# Patient Record
Sex: FEMALE | Race: WHITE | NOT HISPANIC OR LATINO | Employment: OTHER | URBAN - NONMETROPOLITAN AREA
[De-identification: names, ages, dates, MRNs, and addresses within clinical notes are randomized per-mention and may not be internally consistent; named-entity substitution may affect disease eponyms.]

---

## 2024-11-05 ENCOUNTER — OFFICE VISIT (OUTPATIENT)
Dept: MEDICAL GROUP | Facility: PHYSICIAN GROUP | Age: 54
End: 2024-11-05
Payer: MEDICARE

## 2024-11-05 VITALS
OXYGEN SATURATION: 97 % | WEIGHT: 163.2 LBS | RESPIRATION RATE: 20 BRPM | BODY MASS INDEX: 30.03 KG/M2 | TEMPERATURE: 97.1 F | DIASTOLIC BLOOD PRESSURE: 76 MMHG | HEART RATE: 89 BPM | HEIGHT: 62 IN | SYSTOLIC BLOOD PRESSURE: 126 MMHG

## 2024-11-05 DIAGNOSIS — M54.6 CHRONIC BILATERAL THORACIC BACK PAIN: ICD-10-CM

## 2024-11-05 DIAGNOSIS — R00.2 PALPITATIONS: ICD-10-CM

## 2024-11-05 DIAGNOSIS — Z72.0 TOBACCO ABUSE: Chronic | ICD-10-CM

## 2024-11-05 DIAGNOSIS — Z12.12 SCREENING FOR COLORECTAL CANCER: ICD-10-CM

## 2024-11-05 DIAGNOSIS — Z12.11 SCREENING FOR COLORECTAL CANCER: ICD-10-CM

## 2024-11-05 DIAGNOSIS — K21.00 GASTROESOPHAGEAL REFLUX DISEASE WITH ESOPHAGITIS, UNSPECIFIED WHETHER HEMORRHAGE: ICD-10-CM

## 2024-11-05 DIAGNOSIS — E11.9 TYPE 2 DIABETES MELLITUS WITHOUT COMPLICATION, WITHOUT LONG-TERM CURRENT USE OF INSULIN (HCC): ICD-10-CM

## 2024-11-05 DIAGNOSIS — B35.1 ONYCHOMYCOSIS: ICD-10-CM

## 2024-11-05 DIAGNOSIS — F20.0 PARANOID SCHIZOPHRENIA (HCC): ICD-10-CM

## 2024-11-05 DIAGNOSIS — K59.09 OTHER CONSTIPATION: ICD-10-CM

## 2024-11-05 DIAGNOSIS — E78.00 PURE HYPERCHOLESTEROLEMIA: Chronic | ICD-10-CM

## 2024-11-05 DIAGNOSIS — K21.00 GASTROESOPHAGEAL REFLUX DISEASE WITH ESOPHAGITIS: Chronic | ICD-10-CM

## 2024-11-05 DIAGNOSIS — R00.0 TACHYCARDIA: ICD-10-CM

## 2024-11-05 DIAGNOSIS — E11.9 TYPE 2 DIABETES MELLITUS WITHOUT COMPLICATION (HCC): ICD-10-CM

## 2024-11-05 DIAGNOSIS — M54.41 CHRONIC BILATERAL LOW BACK PAIN WITH RIGHT-SIDED SCIATICA: ICD-10-CM

## 2024-11-05 DIAGNOSIS — K21.9 GASTROESOPHAGEAL REFLUX DISEASE WITHOUT ESOPHAGITIS: Chronic | ICD-10-CM

## 2024-11-05 DIAGNOSIS — G89.29 CHRONIC BILATERAL LOW BACK PAIN WITH RIGHT-SIDED SCIATICA: ICD-10-CM

## 2024-11-05 DIAGNOSIS — G89.29 CHRONIC BILATERAL THORACIC BACK PAIN: ICD-10-CM

## 2024-11-05 LAB
HBA1C MFR BLD: 6.5 % (ref ?–5.8)
POCT INT CON NEG: NEGATIVE
POCT INT CON POS: POSITIVE

## 2024-11-05 PROCEDURE — 3078F DIAST BP <80 MM HG: CPT | Performed by: NURSE PRACTITIONER

## 2024-11-05 PROCEDURE — 83036 HEMOGLOBIN GLYCOSYLATED A1C: CPT | Performed by: NURSE PRACTITIONER

## 2024-11-05 PROCEDURE — 3074F SYST BP LT 130 MM HG: CPT | Performed by: NURSE PRACTITIONER

## 2024-11-05 PROCEDURE — 99214 OFFICE O/P EST MOD 30 MIN: CPT | Performed by: NURSE PRACTITIONER

## 2024-11-05 RX ORDER — PANTOPRAZOLE SODIUM 40 MG/1
40 TABLET, DELAYED RELEASE ORAL
Qty: 90 TABLET | Refills: 3 | Status: SHIPPED | OUTPATIENT
Start: 2024-11-05

## 2024-11-05 RX ORDER — FAMOTIDINE 20 MG/1
20 TABLET, FILM COATED ORAL 2 TIMES DAILY
Qty: 200 TABLET | Refills: 3 | Status: SHIPPED | OUTPATIENT
Start: 2024-11-05

## 2024-11-05 RX ORDER — SIMVASTATIN 20 MG
20 TABLET ORAL
Qty: 90 TABLET | Refills: 3 | Status: SHIPPED | OUTPATIENT
Start: 2024-11-05

## 2024-11-05 RX ORDER — OMEPRAZOLE 40 MG/1
40 CAPSULE, DELAYED RELEASE ORAL DAILY
Qty: 90 CAPSULE | Refills: 3 | Status: SHIPPED | OUTPATIENT
Start: 2024-11-05 | End: 2024-11-26

## 2024-11-05 RX ORDER — ATENOLOL 50 MG/1
50 TABLET ORAL 2 TIMES DAILY
Qty: 180 TABLET | Refills: 3 | Status: SHIPPED | OUTPATIENT
Start: 2024-11-05

## 2024-11-05 RX ORDER — MELOXICAM 7.5 MG/1
7.5 TABLET ORAL 2 TIMES DAILY
Qty: 180 TABLET | Refills: 3 | Status: SHIPPED | OUTPATIENT
Start: 2024-11-05

## 2024-11-05 ASSESSMENT — ENCOUNTER SYMPTOMS
NERVOUS/ANXIOUS: 1
HALLUCINATIONS: 0
ABDOMINAL PAIN: 1
BACK PAIN: 1
NAUSEA: 0
INSOMNIA: 1
DIARRHEA: 0
EYES NEGATIVE: 1
FALLS: 0
CONSTIPATION: 1
VOMITING: 0
SHORTNESS OF BREATH: 0
PALPITATIONS: 1
DIZZINESS: 0
FEVER: 0
WEIGHT LOSS: 0
CHILLS: 0
DEPRESSION: 1
HEARTBURN: 0
HEADACHES: 0

## 2024-11-05 ASSESSMENT — FIBROSIS 4 INDEX: FIB4 SCORE: 1.97

## 2024-11-05 ASSESSMENT — LIFESTYLE VARIABLES: SUBSTANCE_ABUSE: 0

## 2024-11-05 NOTE — PROGRESS NOTES
Verbal consent was acquired by the patient to use [employer] ambient listening note generation during this visit     CC:  Chief Complaint   Patient presents with    [facility]       Debbie Ramirez 54 y.o. female  patient presenting for     History of Present Illness  The patient is a 54-year-old female who presents for evaluation of multiple medical concerns. She is accompanied by her sister-in-law.    She has been under the care of Dr. Sanchez for the past 25 years, managing her schizophrenia with alprazolam (0.5 mg, one in the morning and two at night) and Clozaril (200 mg, two at night). She also takes hydroxyzine (50 mg at night) and perphenazine, an antipsychotic medication. Her last psychiatric consultation was yesterday. She reports no hallucinations or thoughts of self-harm or harming others. She is slightly depressed due to her mother's recent passing and experiences some anxiety and sleep issues. She was prescribed Colace for constipation due to some of her medications    palpitations  She experiences palpitations and takes atenolol (50 mg twice daily) for blood pressure control.    hyperlipidemia   She has high cholesterol and takes simvastatin.     DMT2  Currently controlled with metformin 1000 mg BID , lifestyle, diet and exercise.   Retinal exam completed: attempted today but unable to get, new order placed  LDL: needs new labs  ACEi/ARB? none  Statin? simvastatin  Urine Albumin/creatinine: new order placed  Tobacco use: yes 1/2 ppd  BP control: yes 126/76  Concomitant HTN? yes  A1C 6.5 % 11/5/2024  A1C due: 6 months 5/2025  Last monofilament exam- completed  Patient tolerates medications well with no significant or bothersome side effects.   Denies polyuria, polydipsia, polyphagia.   She has been using a FreeStyle Larissa device for diabetes management and takes metformin (one tablet twice daily).       Chronic back pain   meloxicam for severe back pain. She also takes    GERD  omeprazole,  "Prilosec, and Pepcid (one at night).          Review of Systems   Constitutional:  Negative for chills, fever, malaise/fatigue and weight loss.   HENT: Negative.     Eyes: Negative.    Respiratory:  Negative for shortness of breath.    Cardiovascular:  Positive for palpitations. Negative for chest pain and leg swelling.   Gastrointestinal:  Positive for abdominal pain and constipation. Negative for diarrhea, heartburn, nausea and vomiting.   Genitourinary: Negative.    Musculoskeletal:  Positive for back pain and joint pain. Negative for falls.   Skin: Negative.    Neurological:  Negative for dizziness and headaches.   Psychiatric/Behavioral:  Positive for depression. Negative for hallucinations, substance abuse and suicidal ideas. The patient is nervous/anxious and has insomnia.        /76   Pulse 89   Temp 36.2 °C (97.1 °F) (Temporal)   Resp 20   Ht 1.575 m (5' 2\")   Wt 74 kg (163 lb 3.2 oz)   SpO2 97% , Body mass index is 29.85 kg/m².    Physical Exam  Constitutional:       Appearance: Normal appearance. She is obese.   HENT:      Head: Normocephalic and atraumatic.      Right Ear: There is impacted cerumen.      Left Ear: There is impacted cerumen.      Mouth/Throat:      Mouth: Mucous membranes are moist.      Pharynx: Oropharyngeal exudate present. No posterior oropharyngeal erythema.   Eyes:      Extraocular Movements: Extraocular movements intact.      Conjunctiva/sclera: Conjunctivae normal.      Pupils: Pupils are equal, round, and reactive to light.   Cardiovascular:      Rate and Rhythm: Normal rate and regular rhythm.      Pulses: Normal pulses.      Heart sounds: Normal heart sounds.   Pulmonary:      Effort: Pulmonary effort is normal.      Breath sounds: Normal breath sounds.   Musculoskeletal:         General: Normal range of motion.      Cervical back: Normal range of motion and neck supple. No tenderness.   Lymphadenopathy:      Cervical: No cervical adenopathy.   Skin:     General: " Skin is warm and dry.   Neurological:      Mental Status: She is alert and oriented to person, place, and time.   Psychiatric:         Mood and Affect: Mood normal.         Behavior: Behavior normal.         Thought Content: Thought content normal.         Judgment: Judgment normal.           Results  Laboratory Studies 11/5/2024  Hemoglobin A1c was 6.5.    Assessment and Plan    Assessment & Plan  1. Schizophrenia.  She is currently taking alprazolam 0.5 mg (one in the day and two at night), clozapine 200 mg (two at night), hydroxyzine 50 mg at night, and perphenazine. These medications are managed by Dr. Juan Alberto Sanchez. No changes to her psychiatric medications were made during this visit.    2. Hypertension.  She is taking atenolol 50 mg twice a day for blood pressure management. No changes to her medication were made during this visit.    3. Diabetes Mellitus.  She is taking metformin (one tablet twice a day). Her hemoglobin A1c was noted to be 6.5, indicating good control of her diabetes. She will continue with her current medication regimen.    4. Constipation.  She was prescribed Colace to help with bowel movements. It is recommended to start with one tablet and increase to two if necessary.    5. Chronic Pain.  She is taking meloxicam for severe back pain and rheumatoid arthritis. It was discussed that she can take it once or twice a day as needed for pain management.    6. Gastroesophageal Reflux Disease (GERD).  She is taking primidone in the morning and Pepcid at night. This regimen has been effective in managing her GERD symptoms.    7. Hyperlipidemia.  She is taking simvastatin for high cholesterol. No changes to her medication were made during this visit.    8. Toenail Fungus.  She has onychomycosis and will be referred to podiatry for toenail care to prevent infection.    9. Dental Issues.  She has dental issues but refuses to see a dentist. She is advised to maintain oral hygiene by brushing her teeth  and gums with a soft brush and using a gentle mouthwash.    10. Smoking.  She smokes half a pack of cigarettes daily since the age of 17. Smoking cessation was not discussed in detail during this visit.    11. Depression.  She is experiencing mild depression due to the recent passing of her mother. No changes to her current medications were made.    12. Anxiety.  She has mild anxiety. No changes to her current medications were made.    13. Health Maintenance.  A comprehensive set of tests will be conducted, including Cologuard, CBC, CMP, lipid panel, thyroid exam, and urine test. A referral for retinal screening will be made.         1. Type 2 diabetes mellitus without complication (HCC)  Chronic and stable condition  - POCT A1C  - Microalbumin Creat Ratio Urine (Clinic Collect); Future  - Diabetic Monofilament LE Exam  Monofilament testing with a 10 gram force: sensation intact: intact bilaterally  Visual Inspection: Feet without maceration, ulcers, fissures.  Pedal pulses: intact bilaterally    - Referral for Retinal Screening Exam; Future  - Referral to Podiatry  - Lipid Profile; Future    2. Palpitations  Chronic and stable condition  - FREE THYROXINE; Future  - TSH; Future  - Comp Metabolic Panel; Future    3. Other constipation  Chronic and stable condition  - FREE THYROXINE; Future  - TSH; Future  - Comp Metabolic Panel; Future    4. Chronic bilateral low back pain with right-sided sciatica  Chronic and stable condition    5. Gastroesophageal reflux disease without esophagitis  Chronic and stable condition    6. Pure hypercholesterolemia  Chronic and stable condition  - Lipid Profile; Future  - simvastatin (ZOCOR) 20 MG Tab; Take 1 Tablet by mouth every day.  Dispense: 90 Tablet; Refill: 3    7. Paranoid schizophrenia (HCC)  Chronic and stable condition  Continue psychiatry    8. Tobacco abuse  Chronic stable condition  - CBC WITHOUT DIFFERENTIAL; Future    9. Onychomycosis  Chronic and stable condition  -  Referral to Podiatry    10. Screening for colorectal cancer  - Cologuard® colon cancer screening    11. Chronic bilateral thoracic back pain  Chronic stable condition  - meloxicam (MOBIC) 7.5 MG Tab; Take 1 Tablet by mouth 2 times a day.  Dispense: 180 Tablet; Refill: 3    12. Gastroesophageal reflux disease with esophagitis  Chronic stable condition  - famotidine (PEPCID) 20 MG Tab; Take 1 Tablet by mouth 2 times a day.  Dispense: 200 Tablet; Refill: 3  - pantoprazole (PROTONIX) 40 MG Tablet Delayed Response; Take 1 Tablet by mouth every day.  Dispense: 90 Tablet; Refill: 3    13. Gastroesophageal reflux disease with esophagitis, unspecified whether hemorrhage  Chronic and stable condition  - omeprazole (PRILOSEC) 40 MG delayed-release capsule; Take 1 Capsule by mouth every day.  Dispense: 90 Capsule; Refill: 3    14. Tachycardia  Chronic stable condition  - atenolol (TENORMIN) 50 MG Tab; Take 1 Tablet by mouth 2 times a day.  Dispense: 180 Tablet; Refill: 3    15. Type 2 diabetes mellitus without complication, without long-term current use of insulin (HCC)  Chronic stable condition  - metformin (GLUCOPHAGE) 1000 MG tablet; Take 1 Tablet by mouth 2 times a day with meals. TAKE 1 TABLET BY MOUTH 2 TIMES A DAY, WITH MEALS.  Dispense: 180 Tablet; Refill: 3           Discussed with patient possible alternative diagnoses, patient is to take all medications as prescribed.     If symptoms persist FU w/PCP, if symptoms worsen go to emergency room.     If experiencing any side effects from prescribed medications reports to the office immediately or go to emergency room.    Reviewed indication, dosage, usage and potential adverse effects of prescribed medications.     Reviewed risks and benefits of treatment plan. Patient verbalizes understanding of all instruction and verbally agrees to plan.    Return for Pending labs.    This note was created using voice recognition software ([employer]). The accuracy of the dictation is  limited by the abilities of the software. I have reviewed the note prior to signing, however some errors in grammar and context are still possible. If you have any questions related to this note please do not hesitate to contact our office.

## 2024-11-26 DIAGNOSIS — K21.00 GASTROESOPHAGEAL REFLUX DISEASE WITH ESOPHAGITIS, UNSPECIFIED WHETHER HEMORRHAGE: ICD-10-CM

## 2024-11-26 RX ORDER — OMEPRAZOLE 40 MG/1
40 CAPSULE, DELAYED RELEASE ORAL DAILY
Qty: 90 CAPSULE | Refills: 3 | Status: SHIPPED | OUTPATIENT
Start: 2024-11-26

## 2024-11-26 NOTE — TELEPHONE ENCOUNTER
Received request via: Patient    Was the patient seen in the last year in this department? Yes    Does the patient have an active prescription (recently filled or refills available) for medication(s) requested? No    [facility]    Does the patient have [facility] Plus and need 100-day supply? (This applies to ALL medications) Patient does not have SCP

## 2024-12-03 LAB — NONINV COLON CA DNA+OCC BLD SCRN STL QL: POSITIVE

## 2024-12-04 DIAGNOSIS — R19.5 POSITIVE COLORECTAL CANCER SCREENING USING COLOGUARD TEST: ICD-10-CM

## 2024-12-04 NOTE — PROGRESS NOTES
Phone call placed to patient regarding results of colorectal screening.  Sister in law Mallorie who is caregiver to patient answered phone and understands positive Cologuard testing as well as referral to colonoscopy.  Order placed

## 2024-12-26 DIAGNOSIS — K21.00 GASTROESOPHAGEAL REFLUX DISEASE WITH ESOPHAGITIS: Chronic | ICD-10-CM

## 2024-12-26 RX ORDER — FAMOTIDINE 20 MG/1
20 TABLET, FILM COATED ORAL 2 TIMES DAILY
Qty: 200 TABLET | Refills: 3 | Status: SHIPPED | OUTPATIENT
Start: 2024-12-26

## 2025-01-07 ENCOUNTER — OFFICE VISIT (OUTPATIENT)
Dept: MEDICAL GROUP | Facility: PHYSICIAN GROUP | Age: 55
End: 2025-01-07
Payer: MEDICARE

## 2025-01-07 VITALS
HEIGHT: 62 IN | HEART RATE: 95 BPM | SYSTOLIC BLOOD PRESSURE: 124 MMHG | RESPIRATION RATE: 16 BRPM | OXYGEN SATURATION: 95 % | WEIGHT: 168.4 LBS | TEMPERATURE: 97.7 F | DIASTOLIC BLOOD PRESSURE: 78 MMHG | BODY MASS INDEX: 30.99 KG/M2

## 2025-01-07 DIAGNOSIS — E78.00 PURE HYPERCHOLESTEROLEMIA: Chronic | ICD-10-CM

## 2025-01-07 DIAGNOSIS — R19.5 POSITIVE COLORECTAL CANCER SCREENING USING COLOGUARD TEST: ICD-10-CM

## 2025-01-07 DIAGNOSIS — E11.9 TYPE 2 DIABETES MELLITUS WITHOUT COMPLICATION, WITHOUT LONG-TERM CURRENT USE OF INSULIN (HCC): Chronic | ICD-10-CM

## 2025-01-07 PROCEDURE — 3074F SYST BP LT 130 MM HG: CPT | Performed by: NURSE PRACTITIONER

## 2025-01-07 PROCEDURE — 99214 OFFICE O/P EST MOD 30 MIN: CPT | Performed by: NURSE PRACTITIONER

## 2025-01-07 PROCEDURE — 3078F DIAST BP <80 MM HG: CPT | Performed by: NURSE PRACTITIONER

## 2025-01-07 ASSESSMENT — ENCOUNTER SYMPTOMS
NERVOUS/ANXIOUS: 0
WEIGHT LOSS: 0
DIZZINESS: 0
HEADACHES: 0
HALLUCINATIONS: 0
FEVER: 0
PALPITATIONS: 0
CHILLS: 0
SHORTNESS OF BREATH: 0

## 2025-01-07 ASSESSMENT — FIBROSIS 4 INDEX: FIB4 SCORE: 1.97

## 2025-01-08 NOTE — PROGRESS NOTES
"Verbal consent was acquired by the patient to use [employer] ambient listening note generation during this visit     CC:  Chief Complaint   Patient presents with    Lab Results       Debbie GREENWOOD James 54 y.o. female  patient presenting for     History of Present Illness  The patient is a 54-year-old female who presents for follow-up on blood work. She is accompanied by her sister.    Her sister reports that she has been adhering to her metformin regimen. She maintains an active lifestyle, engaging in physical activity every 20 minutes. Her dietary habits include the consumption of Coke Zero and coffee with 1 or 2 packets of diet sugar. Her diet also includes potatoes and some bread. Her sister has been monitoring her blood glucose levels, which were recorded at 147 during the last check. She has been advised to maintain her blood glucose levels below 140. She has oatmeal at home.    She does not consume excessive amounts of water, typically drinking only one bottle per day. Her sister encourages her to increase her water intake. She avoids adding salt to her food.    Sister is requesting referral for gastroenterology to be sent to [place] for positive colorguard testing    FAMILY HISTORY  Her mother had diabetes, high blood pressure, stroke, heart attack, and almost kidney failure due to diabetes. Her mother also had colon cancer and passed away in 2022.    MEDICATIONS  Metformin      Review of Systems   Constitutional:  Negative for chills, fever, malaise/fatigue and weight loss.   Respiratory:  Negative for shortness of breath.    Cardiovascular:  Negative for chest pain and palpitations.   Neurological:  Negative for dizziness and headaches.   Psychiatric/Behavioral:  Negative for hallucinations. The patient is not nervous/anxious.        /78   Pulse 95   Temp 36.5 °C (97.7 °F) (Temporal)   Resp 16   Ht 1.575 m (5' 2\")   Wt 76.4 kg (168 lb 6.4 oz)   SpO2 95% , Body mass index is 30.8 " kg/m².    Physical Exam  Constitutional:       Appearance: Normal appearance. She is obese.   HENT:      Head: Normocephalic and atraumatic.   Pulmonary:      Effort: Pulmonary effort is normal.   Neurological:      Mental Status: She is alert and oriented to person, place, and time.   Psychiatric:         Mood and Affect: Mood normal.         Behavior: Behavior normal.         Thought Content: Thought content normal.         Judgment: Judgment normal.           Results  Laboratory Studies   White blood cell count 8.0, hemoglobin 14.1, hematocrit 42.8, MCV 89, MCH 29.3. A1c was 6.5. Kidney function 106. Sodium is a little bit on the low side. Alkaline phosphatase 78, AST 20, ALT 24. Cholesterol 146, triglycerides 194, HDL 40, LDL 73. T4 0.95, TSH 1.28.    Assessment and Plan    Assessment & Plan  1. Type 2 Diabetes Mellitus.  Her A1C was 6.5 at the last visit, indicating a transition from prediabetes to type 2 diabetes. Her glucose levels are slightly elevated. She is advised to continue her current metformin regimen. Dietary modifications, including reducing soda intake and incorporating more oatmeal and fresh fruits, are recommended. She is encouraged to maintain regular physical activity.    2. Hyponatremia.  Her sodium levels are slightly low. She is advised to increase her fluid intake, particularly water, and reduce her consumption of soda and coffee.    3. Hypertriglyceridemia.  Her triglycerides are elevated at 194 mg/dL, likely due to high sugar intake from soda. Dietary changes, including reducing carbohydrate intake and considering fish oil or omega-3 fatty acids, are recommended.    4. Medication Management.  A referral to a local GI specialist in [place] will be arranged.        1. Pure hypercholesterolemia  Chronic and stable condition     2. Type 2 diabetes mellitus without complication, without long-term current use of insulin (HCC)  Chronic and stable condition     3. Positive colorectal cancer  screening using Cologuard test  Acute and uncomplicated condition          Discussed with patient possible alternative diagnoses, patient is to take all medications as prescribed.     If symptoms persist FU w/PCP, if symptoms worsen go to emergency room.     If experiencing any side effects from prescribed medications reports to the office immediately or go to emergency room.    Reviewed indication, dosage, usage and potential adverse effects of prescribed medications.     Reviewed risks and benefits of treatment plan. Patient verbalizes understanding of all instruction and verbally agrees to plan.    Return for pending referrals.    This note was created using voice recognition software ([employer]). The accuracy of the dictation is limited by the abilities of the software. I have reviewed the note prior to signing, however some errors in grammar and context are still possible. If you have any questions related to this note please do not hesitate to contact our office.

## 2025-01-24 ENCOUNTER — APPOINTMENT (OUTPATIENT)
Dept: MEDICAL GROUP | Facility: PHYSICIAN GROUP | Age: 55
End: 2025-01-24
Payer: MEDICARE

## 2025-01-24 ENCOUNTER — TELEPHONE (OUTPATIENT)
Dept: MEDICAL GROUP | Facility: PHYSICIAN GROUP | Age: 55
End: 2025-01-24

## 2025-01-25 DIAGNOSIS — K21.00 GASTROESOPHAGEAL REFLUX DISEASE WITH ESOPHAGITIS: Chronic | ICD-10-CM

## 2025-01-28 RX ORDER — PANTOPRAZOLE SODIUM 40 MG/1
40 TABLET, DELAYED RELEASE ORAL
Qty: 90 TABLET | Refills: 3 | Status: SHIPPED | OUTPATIENT
Start: 2025-01-28

## 2025-02-22 DIAGNOSIS — E11.9 TYPE 2 DIABETES MELLITUS WITHOUT COMPLICATION, WITHOUT LONG-TERM CURRENT USE OF INSULIN (HCC): ICD-10-CM

## 2025-03-23 DIAGNOSIS — K21.00 GASTROESOPHAGEAL REFLUX DISEASE WITH ESOPHAGITIS: Chronic | ICD-10-CM

## 2025-03-24 NOTE — TELEPHONE ENCOUNTER
Received request via: Patient    Was the patient seen in the last year in this department? Yes    Does the patient have an active prescription (recently filled or refills available) for medication(s) requested? No    [facility]    Does the patient have [employer] Plus and need 100-day supply? (This applies to ALL medications) Patient does not have SCP

## 2025-03-25 RX ORDER — PANTOPRAZOLE SODIUM 40 MG/1
40 TABLET, DELAYED RELEASE ORAL
Qty: 90 TABLET | Refills: 3 | Status: SHIPPED | OUTPATIENT
Start: 2025-03-25

## 2025-05-28 ENCOUNTER — OFFICE VISIT (OUTPATIENT)
Dept: MEDICAL GROUP | Facility: PHYSICIAN GROUP | Age: 55
End: 2025-05-28
Payer: MEDICARE

## 2025-05-28 VITALS
HEIGHT: 62 IN | SYSTOLIC BLOOD PRESSURE: 120 MMHG | OXYGEN SATURATION: 96 % | RESPIRATION RATE: 16 BRPM | HEART RATE: 90 BPM | BODY MASS INDEX: 30.55 KG/M2 | WEIGHT: 166 LBS | TEMPERATURE: 98.2 F | DIASTOLIC BLOOD PRESSURE: 88 MMHG

## 2025-05-28 DIAGNOSIS — M54.41 CHRONIC BILATERAL LOW BACK PAIN WITH RIGHT-SIDED SCIATICA: ICD-10-CM

## 2025-05-28 DIAGNOSIS — R19.5 POSITIVE COLORECTAL CANCER SCREENING USING COLOGUARD TEST: ICD-10-CM

## 2025-05-28 DIAGNOSIS — Z79.899 HIGH RISK MEDICATION USE: ICD-10-CM

## 2025-05-28 DIAGNOSIS — M93.269 OSTEOCHONDRITIS DISSECANS OF KNEE, UNSPECIFIED LATERALITY: ICD-10-CM

## 2025-05-28 DIAGNOSIS — G89.29 CHRONIC BILATERAL LOW BACK PAIN WITH RIGHT-SIDED SCIATICA: ICD-10-CM

## 2025-05-28 DIAGNOSIS — E11.9 TYPE 2 DIABETES MELLITUS WITHOUT COMPLICATION, WITHOUT LONG-TERM CURRENT USE OF INSULIN (HCC): ICD-10-CM

## 2025-05-28 DIAGNOSIS — M25.569 KNEE MENISCUS PAIN, UNSPECIFIED LATERALITY: ICD-10-CM

## 2025-05-28 DIAGNOSIS — E11.9 TYPE 2 DIABETES MELLITUS WITHOUT COMPLICATION (HCC): Primary | ICD-10-CM

## 2025-05-28 LAB
HBA1C MFR BLD: 6.4 % (ref ?–5.8)
POCT INT CON NEG: NEGATIVE
POCT INT CON POS: POSITIVE

## 2025-05-28 PROCEDURE — 99214 OFFICE O/P EST MOD 30 MIN: CPT | Performed by: NURSE PRACTITIONER

## 2025-05-28 PROCEDURE — 3074F SYST BP LT 130 MM HG: CPT | Performed by: NURSE PRACTITIONER

## 2025-05-28 PROCEDURE — 83036 HEMOGLOBIN GLYCOSYLATED A1C: CPT | Performed by: NURSE PRACTITIONER

## 2025-05-28 PROCEDURE — 3079F DIAST BP 80-89 MM HG: CPT | Performed by: NURSE PRACTITIONER

## 2025-05-28 RX ORDER — AVOBENZONE, HOMOSALATE, OCTISALATE, OCTOCRYLENE 30; 40; 45; 26 MG/ML; MG/ML; MG/ML; MG/ML
CREAM TOPICAL
Qty: 100 EACH | Refills: 3 | Status: SHIPPED | OUTPATIENT
Start: 2025-05-28

## 2025-05-28 RX ORDER — HYDROCODONE BITARTRATE AND ACETAMINOPHEN 5; 325 MG/1; MG/1
0.5 TABLET ORAL 2 TIMES DAILY PRN
Qty: 45 TABLET | Refills: 0 | Status: SHIPPED | OUTPATIENT
Start: 2025-05-28 | End: 2025-07-12

## 2025-05-28 RX ORDER — BLOOD-GLUCOSE METER
1 KIT MISCELLANEOUS DAILY
Qty: 100 STRIP | Refills: 3 | Status: SHIPPED | OUTPATIENT
Start: 2025-05-28

## 2025-05-28 ASSESSMENT — ENCOUNTER SYMPTOMS
INSOMNIA: 1
CHILLS: 0
DEPRESSION: 0
SHORTNESS OF BREATH: 0
FEVER: 0
NERVOUS/ANXIOUS: 0
WEIGHT LOSS: 0
PALPITATIONS: 0

## 2025-05-28 ASSESSMENT — FIBROSIS 4 INDEX: FIB4 SCORE: 1.97

## 2025-05-28 NOTE — PROGRESS NOTES
"Verbal consent was acquired by the patient to use [employer] ambient listening note generation during this visit     CC:  Chief Complaint   Patient presents with    Diabetes Follow-up       Debbie Ramirez 54 y.o. female  patient presenting for     History of Present Illness  The patient presents for a diabetic follow-up. She is accompanied by her sister.    Diabetes Management  - Her A1c level today in the office was 6.4%.  - She continues to work on low sugar or zero sugar additives to any of her meals.  - She declined diabetic screening for retinopathy during this visit.    Psychiatric Medications  - The sister reports that she has been managing well with her psychiatric medications and continues to take lorazepam as needed.    Back Pain  - The sister notes that she has been experiencing worsening back pain and is interested in resuming hydrocodone treatment.  - Attempts to manage the pain with meloxicam and Tylenol have not been successful.  - She was previously on a regimen of hydrocodone 5/325, taking 2 tablets twice daily, for knee and back pain.  - This treatment was discontinued about a year ago when her sister assumed her care.    Supplemental information: None.      Review of Systems   Constitutional:  Negative for chills, fever, malaise/fatigue and weight loss.   Respiratory:  Negative for shortness of breath.    Cardiovascular:  Negative for chest pain and palpitations.   Psychiatric/Behavioral:  Negative for depression. The patient has insomnia. The patient is not nervous/anxious.        /88   Pulse 90   Temp 36.8 °C (98.2 °F) (Temporal)   Resp 16   Ht 1.575 m (5' 2\")   Wt 75.3 kg (166 lb)   SpO2 96% , Body mass index is 30.36 kg/m².    Physical Exam  Constitutional:       Appearance: Normal appearance. She is obese.   HENT:      Head: Normocephalic and atraumatic.   Pulmonary:      Effort: Pulmonary effort is normal.   Neurological:      Mental Status: She is alert. Mental status is at " baseline.   Psychiatric:         Mood and Affect: Mood normal.         Behavior: Behavior normal.           Results  Laboratory Studies  A1c was 6.4%.    Assessment and Plan    Assessment & Plan  1. Diabetes Mellitus.  Her A1c today is 6.4%, indicating good control of her diabetes. She continues to work on low sugar or zero sugar additives to her meals. She declined diabetic screening for retinopathy today. A microalbumin test will be sent out to monitor kidney function.    2. Back Pain.  She has been experiencing worsening back pain. She previously took hydrocodone 5 mg/325 mg, 2 tablets twice daily, but this was discontinued about a year ago. Meloxicam and Tylenol have been ineffective in managing her pain. A new referral for gastroenterology will be placed to stay in [place] for a colonoscopy due to a positive colorectal screening.    3. Psychiatric Medication Management.  She continues to do well on her psychiatric medications and uses lorazepam as needed.       1. Type 2 diabetes mellitus without complication (HCC) (Primary)  Chronic and stable condition   - POCT A1C  - Lancets; Use to test once daily Dx E11.9  Non insulin dependent  Dispense: 100 Each; Refill: 3  - glucose blood (FREESTYLE LITE) strip; 1 Strip by Other route every day.  Dispense: 100 Strip; Refill: 3  - MICROALBUMIN CREAT RATIO URINE; Future    2. Type 2 diabetes mellitus without complication, without long-term current use of insulin (HCC)  Chronic and stable condition   - Lancets; Use to test once daily Dx E11.9  Non insulin dependent  Dispense: 100 Each; Refill: 3  - glucose blood (FREESTYLE LITE) strip; 1 Strip by Other route every day.  Dispense: 100 Strip; Refill: 3  - MICROALBUMIN CREAT RATIO URINE; Future    3. Positive colorectal cancer screening using Cologuard test  Chronic and stable condition   - Referral to GI for Colonoscopy    4. High risk medication use  Chronic and stable condition   Pdmp reviewed  New contract signed  Urine to  be collected by lab  - HYDROcodone-acetaminophen (NORCO) 5-325 MG Tab per tablet; Take 0.5 Tablets by mouth 2 times a day as needed (back pain knee pain) for up to 45 days.  Dispense: 45 Tablet; Refill: 0  - Controlled Substance Treatment Agreement  - PAIN MANAGEMENT SCRN, W/ RFLX TO QNT; Future    5. Chronic bilateral low back pain with right-sided sciatica  Chronic and stable condition   - HYDROcodone-acetaminophen (NORCO) 5-325 MG Tab per tablet; Take 0.5 Tablets by mouth 2 times a day as needed (back pain knee pain) for up to 45 days.  Dispense: 45 Tablet; Refill: 0  - Controlled Substance Treatment Agreement  - PAIN MANAGEMENT SCRN, W/ RFLX TO QNT; Future    6. Knee meniscus pain, unspecified laterality  Chronic and stable condition   - HYDROcodone-acetaminophen (NORCO) 5-325 MG Tab per tablet; Take 0.5 Tablets by mouth 2 times a day as needed (back pain knee pain) for up to 45 days.  Dispense: 45 Tablet; Refill: 0  - Controlled Substance Treatment Agreement  - PAIN MANAGEMENT SCRN, W/ RFLX TO QNT; Future    7. Osteochondritis dissecans of knee, unspecified laterality  Chronic and stable condition   - HYDROcodone-acetaminophen (NORCO) 5-325 MG Tab per tablet; Take 0.5 Tablets by mouth 2 times a day as needed (back pain knee pain) for up to 45 days.  Dispense: 45 Tablet; Refill: 0  - Controlled Substance Treatment Agreement  - PAIN MANAGEMENT SCRN, W/ RFLX TO QNT; Future         Discussed with patient possible alternative diagnoses, patient is to take all medications as prescribed.     If symptoms persist FU w/PCP, if symptoms worsen go to emergency room.     If experiencing any side effects from prescribed medications reports to the office immediately or go to emergency room.    Reviewed indication, dosage, usage and potential adverse effects of prescribed medications.     Reviewed risks and benefits of treatment plan. Patient verbalizes understanding of all instruction and verbally agrees to plan.    No  follow-ups on file.    This note was created using voice recognition software (Fio). The accuracy of the dictation is limited by the abilities of the software. I have reviewed the note prior to signing, however some errors in grammar and context are still possible. If you have any questions related to this note please do not hesitate to contact our office.

## 2025-06-05 NOTE — Clinical Note
REFERRAL APPROVAL NOTICE         Sent on June 5, 2025                   Debbie Ramirez  [address] [facility] [address]                   Dear Ms. Ramirez,    After a careful review of the medical information and benefit coverage, [facility] has processed your referral. See below for additional details.    If applicable, you must be actively enrolled with your insurance for coverage of the authorized service. If you have any questions regarding your coverage, please contact your insurance directly.    REFERRAL INFORMATION   Referral #:  [id number]  Referred-To Provider    Referred-By Provider:  Gastroenterology    [facility]      [address]  [address]  [facility] [address]  [phone] [address]  [facility] [address]  [phone]    Referral Start Date:  05/28/2025  Referral End Date:   05/28/2026             SCHEDULING  If you do not already have an appointment, please call [phone] to make an appointment.     MORE INFORMATION  If you do not already have a [facility] account, sign up at: [email or web address]  You can access your medical information, make appointments, see lab results, billing information, and more.  If you have questions regarding this referral, please contact  the [facility] Referrals department at:             [phone]. Monday - Friday 8:00AM - 5:00PM.     Sincerely,    [facility]

## 2025-07-01 DIAGNOSIS — K21.00 GASTROESOPHAGEAL REFLUX DISEASE WITH ESOPHAGITIS: Chronic | ICD-10-CM

## 2025-07-01 RX ORDER — FAMOTIDINE 20 MG/1
20 TABLET, FILM COATED ORAL 2 TIMES DAILY
Qty: 180 TABLET | Refills: 4 | Status: SHIPPED | OUTPATIENT
Start: 2025-07-01

## 2025-07-01 NOTE — TELEPHONE ENCOUNTER
Received request via: Pharmacy    Was the patient seen in the last year in this department? Yes    Does the patient have an active prescription (recently filled or refills available) for medication(s) requested? No    Pharmacy Name: [facility]/pharmacy #[id number] - [address]     Does the patient have long term Plus and need 100-day supply? (This applies to ALL medications) Patient does not have SCP

## 2025-07-22 ENCOUNTER — APPOINTMENT (OUTPATIENT)
Dept: MEDICAL GROUP | Facility: PHYSICIAN GROUP | Age: 55
End: 2025-07-22
Payer: MEDICARE

## 2025-07-24 ENCOUNTER — OFFICE VISIT (OUTPATIENT)
Dept: MEDICAL GROUP | Facility: PHYSICIAN GROUP | Age: 55
End: 2025-07-24

## 2025-07-24 VITALS
HEART RATE: 95 BPM | BODY MASS INDEX: 30.18 KG/M2 | SYSTOLIC BLOOD PRESSURE: 144 MMHG | HEIGHT: 62 IN | WEIGHT: 164 LBS | DIASTOLIC BLOOD PRESSURE: 70 MMHG | TEMPERATURE: 97.4 F | OXYGEN SATURATION: 98 %

## 2025-07-24 DIAGNOSIS — M54.41 CHRONIC BILATERAL LOW BACK PAIN WITH RIGHT-SIDED SCIATICA: ICD-10-CM

## 2025-07-24 DIAGNOSIS — Z79.899 HIGH RISK MEDICATION USE: ICD-10-CM

## 2025-07-24 DIAGNOSIS — M25.569 KNEE MENISCUS PAIN, UNSPECIFIED LATERALITY: ICD-10-CM

## 2025-07-24 DIAGNOSIS — G89.29 CHRONIC BILATERAL LOW BACK PAIN WITH RIGHT-SIDED SCIATICA: ICD-10-CM

## 2025-07-24 DIAGNOSIS — M93.269 OSTEOCHONDRITIS DISSECANS OF KNEE, UNSPECIFIED LATERALITY: ICD-10-CM

## 2025-07-24 PROCEDURE — 99214 OFFICE O/P EST MOD 30 MIN: CPT | Performed by: NURSE PRACTITIONER

## 2025-07-24 PROCEDURE — 3078F DIAST BP <80 MM HG: CPT | Performed by: NURSE PRACTITIONER

## 2025-07-24 PROCEDURE — 3077F SYST BP >= 140 MM HG: CPT | Performed by: NURSE PRACTITIONER

## 2025-07-24 RX ORDER — HYDROCODONE BITARTRATE AND ACETAMINOPHEN 5; 325 MG/1; MG/1
0.5 TABLET ORAL 2 TIMES DAILY PRN
Qty: 45 TABLET | Refills: 0 | Status: SHIPPED | OUTPATIENT
Start: 2025-07-24 | End: 2025-09-07

## 2025-07-24 ASSESSMENT — ENCOUNTER SYMPTOMS
CHILLS: 0
INSOMNIA: 0
SHORTNESS OF BREATH: 0
DEPRESSION: 0
FEVER: 0
NERVOUS/ANXIOUS: 0
PALPITATIONS: 0
HEADACHES: 0
WEIGHT LOSS: 0
DIZZINESS: 0

## 2025-07-24 ASSESSMENT — FIBROSIS 4 INDEX: FIB4 SCORE: 2.01

## 2025-07-24 NOTE — PROGRESS NOTES
"Verbal consent was acquired by the patient to use [employer] ambient listening note generation during this visit     CC:  Chief Complaint   Patient presents with    Medication Refill       Debbie Ramirez 55 y.o. female  patient presenting for     History of Present Illness  The patient is a 55-year-old female who presents for a follow-up on her medications.    Medication Regimen  - She has been under the care of her sister, who recently introduced new medications into her regimen.  - New medications include hydroxyzine as well as for perphenazine.  Patient's sister-in-law Mallorie feels that since that she is concerned is medications she is more lethargic at times.  After reviewing chart it does look like her psychiatrist Dr. Sanchez filled these recently in June.  -- She has been seeing Dr. Sanchez, a psychiatrist, for approximately 30 years and has an upcoming telemedicine appointment with him next month.    Arthritis  - She has been taking meloxicam for arthritis.  Requesting refill on her Norco          Review of Systems   Constitutional:  Negative for chills, fever, malaise/fatigue and weight loss.   Respiratory:  Negative for shortness of breath.    Cardiovascular:  Negative for chest pain and palpitations.   Neurological:  Negative for dizziness and headaches.   Psychiatric/Behavioral:  Negative for depression and suicidal ideas. The patient is not nervous/anxious and does not have insomnia.        BP (!) 144/70 (BP Location: Left arm, Patient Position: Sitting, BP Cuff Size: Large adult)   Pulse 95   Temp 36.3 °C (97.4 °F)   Ht 1.575 m (5' 2\")   Wt 74.4 kg (164 lb)   SpO2 98% , Body mass index is 30 kg/m².    Physical Exam  Constitutional:       General: She is not in acute distress.     Appearance: Normal appearance. She is obese. She is not ill-appearing.   HENT:      Head: Normocephalic and atraumatic.   Pulmonary:      Effort: Pulmonary effort is normal.   Musculoskeletal:         General: Normal " range of motion.   Neurological:      Mental Status: She is alert.   Psychiatric:         Attention and Perception: She does not perceive auditory or visual hallucinations.         Mood and Affect: Mood normal.         Behavior: Behavior is cooperative.           Results      Assessment and Plan    Assessment & Plan  1. Medication management.  - She is currently on simvastatin, atenolol, metformin, perphenazine, hydroxyzine, pantoprazole, Pepcid, Prilosec, Narcan, meloxicam, and clozapine.  - The potential side effects of these medications were discussed.  - She will continue her current regimen of hydroxyzine and promethazine. The use of pantoprazole will be discontinued as her reflux symptoms are well-managed with Pepcid and Prilosec.  - Records from Dr. Sanchez will be requested to understand the rationale behind the initiation of perphenazine.    2. Gastroesophageal Reflux Disease (GERD).  - She is currently taking Pepcid and Prilosec for GERD.  - Her symptoms are well-controlled with these medications.  - Pantoprazole will be discontinued as it is not necessary given the effective control of her symptoms with the other two medications.  - Continued monitoring of her reflux symptoms will be done to ensure they remain controlled.    3. Arthritis.  - She is taking meloxicam for arthritis.  - She reports no complaints of back pain and has not required the use of pain cream.  - A new mattress with a pillow top and gel memory foam has also helped alleviate her symptoms.  - Continued use of meloxicam is recommended for managing arthritis symptoms.    4. Schizophrenia.  - She is on clozapine and alprazolam for schizophrenia.  - She reports that these medications help her sleep at night.  - The potential sedative effects of these medications were discussed.  - Records from Dr. Sanchez will be requested to understand the rationale behind the initiation of perphenazine.       1. High risk medication use  Chronic and stable  condition  PDMP reviewed  - HYDROcodone-acetaminophen (NORCO) 5-325 MG Tab per tablet; Take 0.5 Tablets by mouth 2 times a day as needed (back pain knee pain) for up to 45 days.  Dispense: 45 Tablet; Refill: 0    2. Chronic bilateral low back pain with right-sided sciatica  Chronic and stable condition  PDMP reviewed  - HYDROcodone-acetaminophen (NORCO) 5-325 MG Tab per tablet; Take 0.5 Tablets by mouth 2 times a day as needed (back pain knee pain) for up to 45 days.  Dispense: 45 Tablet; Refill: 0    3. Knee meniscus pain, unspecified laterality  Chronic and stable condition  PDMP reviewed  - HYDROcodone-acetaminophen (NORCO) 5-325 MG Tab per tablet; Take 0.5 Tablets by mouth 2 times a day as needed (back pain knee pain) for up to 45 days.  Dispense: 45 Tablet; Refill: 0    4. Osteochondritis dissecans of knee, unspecified laterality  Chronic and stable condition  PDMP reviewed  - HYDROcodone-acetaminophen (NORCO) 5-325 MG Tab per tablet; Take 0.5 Tablets by mouth 2 times a day as needed (back pain knee pain) for up to 45 days.  Dispense: 45 Tablet; Refill: 0       Discussed with patient possible alternative diagnoses, patient is to take all medications as prescribed.     If symptoms persist FU w/PCP, if symptoms worsen go to emergency room.     If experiencing any side effects from prescribed medications reports to the office immediately or go to emergency room.    Reviewed indication, dosage, usage and potential adverse effects of prescribed medications.     Reviewed risks and benefits of treatment plan. Patient verbalizes understanding of all instruction and verbally agrees to plan.    Return in about 6 weeks (around 9/4/2025) for refills.    This note was created using voice recognition software (Atlas5D). The accuracy of the dictation is limited by the abilities of the software. I have reviewed the note prior to signing, however some errors in grammar and context are still possible. If you have any  questions related to this note please do not hesitate to contact our office.

## 2025-07-24 NOTE — LETTER
[facility]  SANDY Davis  [address]  [facility] [address]  Fax: [phone]   Authorization for Release/Disclosure of   Protected Health Information   Name: STAN HOFF : 1970 SSN: [id number]   Address: [address] [place] [address] Phone:    [phone] (home)    I authorize the entity listed below to release/disclose the PHI below to:   [facility]/SANDY Davis and SANDY Davis   Provider or Entity Name:  Daniel MICHEL MD    [facility]  [address] [address]  Phone:  [phone]     Fax:     Reason for request: continuity of care   Information to be released:    [  ] LAST COLONOSCOPY,  including any PATH REPORT and follow-up  [  ] LAST FIT/COLOGUARD RESULT [  ] LAST DEXA  [  ] LAST MAMMOGRAM  [  ] LAST PAP  [  ] LAST LABS [  ] RETINA EXAM REPORT  [  ] IMMUNIZATION RECORDS  [ X ] Release all info      [  ] Check here and initial the line next to each item to release ALL health information INCLUDING  _____ Care and treatment for drug and / or alcohol abuse  _____ HIV testing, infection status, or AIDS  _____ Genetic Testing    DATES OF SERVICE OR TIME PERIOD TO BE DISCLOSED: _____________  I understand and acknowledge that:  * This Authorization may be revoked at any time by you in writing, except if your health information has already been used or disclosed.  * Your health information that will be used or disclosed as a result of you signing this authorization could be re-disclosed by the recipient. If this occurs, your re-disclosed health information may no longer be protected by State or Federal laws.  * You may refuse to sign this Authorization. Your refusal will not affect your ability to obtain treatment.  * This Authorization becomes effective upon signing and will  on (date) __________.      If no date is indicated, this Authorization will  one (1) year from the  signature date.    Name: Debbie GREENWOOD James  Signature: Date:   7/24/2025     PLEASE FAX REQUESTED RECORDS BACK TO: [phone]

## 2025-08-08 DIAGNOSIS — E11.9 TYPE 2 DIABETES MELLITUS WITHOUT COMPLICATION, WITHOUT LONG-TERM CURRENT USE OF INSULIN (HCC): ICD-10-CM
